# Patient Record
Sex: MALE | Race: WHITE | URBAN - METROPOLITAN AREA
[De-identification: names, ages, dates, MRNs, and addresses within clinical notes are randomized per-mention and may not be internally consistent; named-entity substitution may affect disease eponyms.]

---

## 2018-11-02 ENCOUNTER — OFFICE VISIT (OUTPATIENT)
Dept: ORTHOPEDICS | Facility: CLINIC | Age: 28
End: 2018-11-02
Payer: COMMERCIAL

## 2018-11-02 VITALS — WEIGHT: 189 LBS | HEIGHT: 71 IN | BODY MASS INDEX: 26.46 KG/M2

## 2018-11-02 DIAGNOSIS — M76.51 PATELLAR TENDINITIS OF RIGHT KNEE: ICD-10-CM

## 2018-11-02 DIAGNOSIS — G89.29 CHRONIC PAIN OF RIGHT KNEE: Primary | ICD-10-CM

## 2018-11-02 DIAGNOSIS — M25.561 CHRONIC PAIN OF RIGHT KNEE: Primary | ICD-10-CM

## 2018-11-02 DIAGNOSIS — S76.301A RIGHT HAMSTRING INJURY, INITIAL ENCOUNTER: ICD-10-CM

## 2018-11-02 NOTE — MR AVS SNAPSHOT
"              After Visit Summary   11/2/2018    Oniel Menard    MRN: [id number]           Patient Information     Date Of Birth          1990        Visit Information        Provider Department      11/2/2018 10:20 AM Yesenia Araujo MD [facility]        Today's Diagnoses     Chronic pain of right knee    -  1    Right hamstring injury, initial encounter        Patellar tendinitis of right knee           Follow-ups after your visit        Additional Services     PHYSICAL THERAPY REFERRAL (Internal)       Physical Therapy Referral                  Follow-up notes from your care team     Return in about 6 weeks (around 12/14/2018), or if symptoms worsen or fail to improve.      Your next 10 appointments already scheduled     Nov 08, 2018  8:20 AM CST   (Arrive by 8:05 AM)   [facility] Extremity with Mary Barrett PT   [facility] [place] ([address] [id number] [phone]              Future tests that were ordered for you today     Open Future Orders        Priority Expected Expires Ordered    PHYSICAL THERAPY REFERRAL (Internal) Routine  11/2/2019 11/2/2018            Who to contact     Please call your clinic at [phone] to:    Ask questions about your health    Make or cancel appointments    Discuss your medicines    Learn about your test results    Speak to your doctor            Additional Information About Your Visit        Care EveryWhere ID     This is your Care EveryWhere ID. This could be used by other organizations to access your [facility] medical records  [id number]        Your Vitals Were     Height BMI (Body Mass Index)                5' 11\" (1.803 m) 26.36 kg/m2           Blood Pressure from Last 3 Encounters:   No data found for BP    Weight from Last 3 Encounters:   11/02/18 189 lb (85.7 kg)               Primary Care Provider    None Specified       No " primary provider on file.        Equal Access to Services     BRADADITHYA SHANE : Hadii mingo Kelly, josr meadows, galen spain. So [facility] [phone].    ATENCIÓN: Si habla español, tiene a retana disposición servicios gratuitos de asistencia lingüística. Llame al [phone].    We comply with applicable federal civil rights laws and [place] laws. We do not discriminate on the basis of race, color, national origin, age, disability, sex, sexual orientation, or gender identity.            Thank you!     Thank you for choosing [facility]  for your care. Our goal is always to provide you with excellent care. Hearing back from our patients is one way we can continue to improve our services. Please take a few minutes to complete the written survey that you may receive in the mail after your visit with us. Thank you!             Your Updated Medication List - Protect others around you: Learn how to safely use, store and throw away your medicines at www.disposemymeds.org.      Notice  As of 11/2/2018 12:50 PM    You have not been prescribed any medications.

## 2018-11-02 NOTE — PROGRESS NOTES
SPORTS & ORTHOPEDIC WALK-IN VISIT 11/2/2018    Primary Care Physician:      Right knee pain/lump. He has noticed this lump and tenderness for about 2-3 months. He has pain with squatting. He cannot think of any incident that caused the pain/lump. Lump is over the tibial tuberosity. He has minimal tenderness with palpation. He has has ACL surgery on his left knee in past.     Reason for visit:     What part of your body is injured / painful?  right knee    What caused the injury /pain? No inciting event     How long ago did your injury occur or pain begin? 2-3 months     What are your most bothersome symptoms? Pain    How would you characterize your symptom?  aching and sharp    What makes your symptoms better? Rest, Ice and Tylenol    What makes your symptoms worse? Standing, Walking, Movement and Squatting    Have you been previously seen for this problem? Yes, PT at work     Medical History:    Any recent changes to your medical history? No    Any new medication prescribed since last visit? No    Have you had surgery on this body part before? No    Social History:    Occupation:      Handedness: Right    Exercise: Most days/week    Review of Systems:    Do you have fever, chills, weight loss? No    Do you have any vision problems? No    Do you have any chest pain or edema? No    Do you have any shortness of breath or wheezing?  No    Do you have stomach problems? No    Do you have any numbness or focal weakness? No    Do you have diabetes? No    Do you have problems with bleeding or clotting? No    Do you have an rashes or other skin lesions? No

## 2018-11-02 NOTE — LETTER
11/2/2018       RE: Oniel Menard  [address] [place] [facility] [address]     Dear Colleague,    Thank you for referring your patient, Oniel Menard, to the [facility]IN[facility] at [facility]. Please see a copy of my visit note below.          SPORTS & ORTHOPEDIC WALK-IN VISIT 11/2/2018    Primary Care Physician:      Right knee pain/lump. He has noticed this lump and tenderness for about 2-3 months. He has pain with squatting. He cannot think of any incident that caused the pain/lump. Lump is over the tibial tuberosity. He has minimal tenderness with palpation. He has has ACL surgery on his left knee in past.     Reason for visit:     What part of your body is injured / painful?  right knee    What caused the injury /pain? No inciting event     How long ago did your injury occur or pain begin? 2-3 months     What are your most bothersome symptoms? Pain    How would you characterize your symptom?  aching and sharp    What makes your symptoms better? Rest, Ice and Tylenol    What makes your symptoms worse? Standing, Walking, Movement and Squatting    Have you been previously seen for this problem? Yes, PT at work     Medical History:    Any recent changes to your medical history? No    Any new medication prescribed since last visit? No    Have you had surgery on this body part before? No    Social History:    Occupation:      Handedness: Right    Exercise: Most days/week    Review of Systems:    Do you have fever, chills, weight loss? No    Do you have any vision problems? No    Do you have any chest pain or edema? No    Do you have any shortness of breath or wheezing?  No    Do you have stomach problems? No    Do you have any numbness or focal weakness? No    Do you have diabetes? No    Do you have problems with bleeding or clotting? No    Do you have an rashes or other skin lesions? No           SUBJECTIVE:  "Oniel Menard is a 27 year old male who right knee pain at insertion of patella tendon.  Doesn't know how this happened.  Hamstring injury on the right a couple months ago.  .  Squats, staying away from heavy weights.  Doing lunges.  Some pain during and after activities.  Biking for warm-up. Knee feels fine on the bike.  Staying away from running.  Bracing for prevention, soccer as High Schooler.    ACL reconstruction- [place]  Quad dominant in general  PAST MEDICAL, SOCIAL, SURGICAL AND FAMILY HISTORY: He  has no past medical history on file.  He  has no past surgical history on file.  His family history is not on file.  He reports that he has never smoked. He has never used smokeless tobacco.      ALLERGIES: He has No Known Allergies.    CURRENT MEDICATIONS: He currently has no medications in their medication list.     REVIEW OF SYSTEMS: 10 point review of systems is negative except as noted above.    EXAM:  Ht 1.803 m (5' 11\")  Wt 85.7 kg (189 lb)  BMI 26.36 kg/m2  CONSTITUTIONIAL: healthy, alert, no distress and cooperative  HEAD: Normocephalic. No masses, lesions, tenderness or abnormalities  ENT: ENT exam normal, no neck nodes or sinus tenderness  SKIN: no suspicious lesions or rashes  GAIT: normal  Stance: normal  NEUROLOGIC: Normal muscle tone and strength, reflexes normal, sensation grossly normal.  PSYCHIATRIC: affect normal/bright and mentation appears normal.    MUSCULOSKELETAL: right knee pain      Inspection:       AP/lateral alignment normal  Tender: lateral patella glide, insertional pain of patella tendon  Likely OGS as child      Non-tender: patellar facets, MCL, LCL, lateral joint line, medial joint line, IT band, posterior knee   Active Range of Motion: all normal  Strength: quad  5-/5, Hamstrings  5-/5, Gastroc  5/5, Tibialis anterior  5/5, Peroneals  5/5 and core strength  5-/5 hip abductors and other core muscles   Special tests: normal Valgus stress test, normal " Varus, negative Lachman's test, negative pivot shift, negative posterior drawer, no posterolateral corner signs, negative Andrew's, no apprehension with lateral stress of the patella.        ASSESSMENT/PLAN:  Pt is a 28 yo white male with PMHx of right hamstring pain presenting with right anterior knee pain  1. Right anterior knee pain- insertional patella tendonitis, lateral tracking of patella- see PT, trial of Chadwick taping  Strengthening  S/p ACL reconstruction on left side    RTC 6 weeks/PRN    X-RAY INTERPRETATION:   none    Again, thank you for allowing me to participate in the care of your patient.      Sincerely,    Yesenia Araujo MD

## 2018-11-02 NOTE — PROGRESS NOTES
"SUBJECTIVE: Oniel Menard is a 27 year old male who right knee pain at insertion of patella tendon.  Doesn't know how this happened.  Hamstring injury on the right a couple months ago.  .  Squats, staying away from heavy weights.  Doing lunges.  Some pain during and after activities.  Biking for warm-up. Knee feels fine on the bike.  Staying away from running.  Bracing for prevention, soccer as High Schooler.    ACL reconstruction- [place]  Quad dominant in general  PAST MEDICAL, SOCIAL, SURGICAL AND FAMILY HISTORY: He  has no past medical history on file.  He  has no past surgical history on file.  His family history is not on file.  He reports that he has never smoked. He has never used smokeless tobacco.      ALLERGIES: He has No Known Allergies.    CURRENT MEDICATIONS: He currently has no medications in their medication list.     REVIEW OF SYSTEMS: 10 point review of systems is negative except as noted above.    EXAM:  Ht 1.803 m (5' 11\")  Wt 85.7 kg (189 lb)  BMI 26.36 kg/m2  CONSTITUTIONIAL: healthy, alert, no distress and cooperative  HEAD: Normocephalic. No masses, lesions, tenderness or abnormalities  ENT: ENT exam normal, no neck nodes or sinus tenderness  SKIN: no suspicious lesions or rashes  GAIT: normal  Stance: normal  NEUROLOGIC: Normal muscle tone and strength, reflexes normal, sensation grossly normal.  PSYCHIATRIC: affect normal/bright and mentation appears normal.    MUSCULOSKELETAL: right knee pain      Inspection:       AP/lateral alignment normal  Tender: lateral patella glide, insertional pain of patella tendon  Likely OGS as child      Non-tender: patellar facets, MCL, LCL, lateral joint line, medial joint line, IT band, posterior knee   Active Range of Motion: all normal  Strength: quad  5-/5, Hamstrings  5-/5, Gastroc  5/5, Tibialis anterior  5/5, Peroneals  5/5 and core strength  5-/5 hip abductors and other core muscles   Special tests: normal Valgus stress " test, normal Varus, negative Lachman's test, negative pivot shift, negative posterior drawer, no posterolateral corner signs, negative Andrew's, no apprehension with lateral stress of the patella.        ASSESSMENT/PLAN:  Pt is a 26 yo white male with PMHx of right hamstring pain presenting with right anterior knee pain  1. Right anterior knee pain- insertional patella tendonitis, lateral tracking of patella- see PT, trial of Chadwick taping  Strengthening  S/p ACL reconstruction on left side    RTC 6 weeks/PRN    X-RAY INTERPRETATION:   none

## 2018-11-02 NOTE — LETTER
Date:November 5, 2018      Patient was self referred, no letter generated. Do not send.        [facility] Health Information

## 2018-11-08 ENCOUNTER — THERAPY VISIT (OUTPATIENT)
Dept: PHYSICAL THERAPY | Facility: CLINIC | Age: 28
End: 2018-11-08
Attending: FAMILY MEDICINE
Payer: COMMERCIAL

## 2018-11-08 DIAGNOSIS — S76.301A RIGHT HAMSTRING INJURY, INITIAL ENCOUNTER: ICD-10-CM

## 2018-11-08 DIAGNOSIS — M25.561 RIGHT KNEE PAIN, UNSPECIFIED CHRONICITY: Primary | ICD-10-CM

## 2018-11-08 DIAGNOSIS — G89.29 CHRONIC PAIN OF RIGHT KNEE: ICD-10-CM

## 2018-11-08 DIAGNOSIS — M76.51 PATELLAR TENDINITIS OF RIGHT KNEE: ICD-10-CM

## 2018-11-08 DIAGNOSIS — M25.561 CHRONIC PAIN OF RIGHT KNEE: ICD-10-CM

## 2018-11-08 PROCEDURE — 97530 THERAPEUTIC ACTIVITIES: CPT | Mod: GP | Performed by: PHYSICAL THERAPIST

## 2018-11-08 PROCEDURE — 97161 PT EVAL LOW COMPLEX 20 MIN: CPT | Mod: GP | Performed by: PHYSICAL THERAPIST

## 2018-11-08 PROCEDURE — 97112 NEUROMUSCULAR REEDUCATION: CPT | Mod: GP | Performed by: PHYSICAL THERAPIST

## 2018-11-08 ASSESSMENT — ACTIVITIES OF DAILY LIVING (ADL)
GO UP STAIRS: ACTIVITY IS NOT DIFFICULT
WALK: ACTIVITY IS NOT DIFFICULT
AS_A_RESULT_OF_YOUR_KNEE_INJURY,_HOW_WOULD_YOU_RATE_YOUR_CURRENT_LEVEL_OF_DAILY_ACTIVITY?: NEARLY NORMAL
RAW_SCORE: 61
GO DOWN STAIRS: ACTIVITY IS MINIMALLY DIFFICULT
HOW_WOULD_YOU_RATE_THE_OVERALL_FUNCTION_OF_YOUR_KNEE_DURING_YOUR_USUAL_DAILY_ACTIVITIES?: NEARLY NORMAL
KNEE_ACTIVITY_OF_DAILY_LIVING_SCORE: 87.14
RISE FROM A CHAIR: ACTIVITY IS NOT DIFFICULT
STAND: ACTIVITY IS NOT DIFFICULT
HOW_WOULD_YOU_RATE_THE_CURRENT_FUNCTION_OF_YOUR_KNEE_DURING_YOUR_USUAL_DAILY_ACTIVITIES_ON_A_SCALE_FROM_0_TO_100_WITH_100_BEING_YOUR_LEVEL_OF_KNEE_FUNCTION_PRIOR_TO_YOUR_INJURY_AND_0_BEING_THE_INABILITY_TO_PERFORM_ANY_OF_YOUR_USUAL_DAILY_ACTIVITIES?: 80
PAIN: THE SYMPTOM AFFECTS MY ACTIVITY SLIGHTLY
WEAKNESS: THE SYMPTOM AFFECTS MY ACTIVITY SLIGHTLY
LIMPING: I DO NOT HAVE THE SYMPTOM
SQUAT: ACTIVITY IS SOMEWHAT DIFFICULT
KNEE_ACTIVITY_OF_DAILY_LIVING_SUM: 61
SWELLING: I HAVE THE SYMPTOM BUT IT DOES NOT AFFECT MY ACTIVITY
GIVING WAY, BUCKLING OR SHIFTING OF KNEE: I DO NOT HAVE THE SYMPTOM
SIT WITH YOUR KNEE BENT: ACTIVITY IS NOT DIFFICULT
STIFFNESS: I HAVE THE SYMPTOM BUT IT DOES NOT AFFECT MY ACTIVITY
KNEEL ON THE FRONT OF YOUR KNEE: ACTIVITY IS NOT DIFFICULT

## 2018-11-08 NOTE — PROGRESS NOTES
[facility] Initial Evaluation  Subjective:  Patient is a 27 year old male presenting with rehab right knee hpi. The history is provided by the patient.   Oniel Menard is a 27 year old male with a right knee condition.  Occurance: knee pain onset after HS strain while sliding in soccer.  Condition occurred: during recreation/sport.  This is a new condition  August 2018 - pt reports initially strained right hamstring during a slide in soccer about 2-3 months ago. Shortly after this he developed right anterior knee pain and swelling. No previous history of right knee problems, does have history of left ACL repair. Hamstring has greatly improved, now just anterior knee pain with greatest limitation in squatting. Is a  at [employer]. Used to have tenderness but has has improved.   Doing lower weights now with squatting. .    Patient reports pain:  Anterior (tibial tubercle).  Radiates to: none.  Pain is described as other (tenderness) and is intermittent and reported as 4/10 (at worst recently, used to get up to 8/10).  Associated with: decreased strength, used to have swelling. Pain is the same all the time (activity dependent).  Symptoms are exacerbated by bending/squatting and other (lunges) and relieved by other (patellar strap wearing at bedtime, just started some KT-taping tuesday).  Since onset symptoms are gradually improving.  Special testing: none.  Previous treatment: none.  Improvement with previous treatment: NA.  General health as reported by patient is excellent.                                              Objective:  Standing Alignment:              Knee deviations alignment: internal tibial rotation, lateral femoral rotation bilat in stance.                                                       Hip Evaluation    Hip Strength:    Flexion:   Left: 5/5   Pain:  Right: 5/5   Pain:                    Extension:  Left: 5/5  Pain:Right: 5/5    Pain:     Abduction:  Left: 5-/5     Pain:Right: 4+/5    Pain:                           Knee Evaluation:  ROM:  PROM: normal  Strength wnl knee: prone hamstring testing on right: at 110 deg flexion: 5-/5 with prox HS belly pain. at 90 deg flexion: 5-/5 with anterior knee pain. at 60 deg flexion: 5/5 painfree.          Strength:     Extension:  Left: 5/5   Pain:      Right: 5/5   Pain:  Flexion:  Left: 5/5   Pain:      Right: 5-/5   Pain:        Ligament Testing:    Varus 0:  Right:  Neg    Valgus 0:  Right:  Neg  Valgus 30:  Right:  Neg  Anterior Drawer:  Right:  Neg      Special Tests:       Right knee negative for the following special tests:  Meniscal  Palpation:  Palpation of knee: trace tenderness at tibial tubercle (site of localized pain)      Right knee tenderness not present at:  Patellar Tendon and Patellar Inferior  Edema:  Normal    Mobility Testing:      Patellofemoral Medial:  Right: normal  Patellofemoral Lateral:  Right: normal  Patellofemoral Superior:  Right: normal  Patellofemoral Inferior:  Right: normal  Functional Testing:  : 6 inch front elidia down: left  - normal control. right - contralat pelvic drop.        Quad:    Single Leg Squat:  Left:        Normal control  Right:      Slight decr frontal plane knee stability, pain  Mild loss of control  Bilateral Leg Squat:   Normal control            With trial of lateral>medial patellar glide taping: decr pain with SL squat after  General     ROS    Assessment/Plan:    Patient is a 27 year old male with right side knee complaints.    Patient has the following significant findings with corresponding treatment plan.                Diagnosis 1:  Right knee pain/distal patellar tendinitis    Pain -  splint/taping/bracing/orthotics, self management, education and home program  Decreased strength - therapeutic exercise, therapeutic activities and home program  Decreased proprioception - neuro re-education, therapeutic activities and home program  Decreased  function - therapeutic activities and home program    Therapy Evaluation Codes:   1) History comprised of:   Personal factors that impact the plan of care:      None.    Comorbidity factors that impact the plan of care are:      None.     Medications impacting care: None.  2) Examination of Body Systems comprised of:   Body structures and functions that impact the plan of care:      Knee.   Activity limitations that impact the plan of care are:      Squatting/kneeling and lunges.  3) Clinical presentation characteristics are:   Stable/Uncomplicated.  4) Decision-Making    Low complexity using standardized patient assessment instrument and/or measureable assessment of functional outcome.  Cumulative Therapy Evaluation is: Low complexity.    Previous and current functional limitations:  (See Goal Flow Sheet for this information)    Short term and Long term goals: (See Goal Flow Sheet for this information)     Communication ability:  Patient appears to be able to clearly communicate and understand verbal and written communication and follow directions correctly.  Treatment Explanation - The following has been discussed with the patient:   RX ordered/plan of care  Anticipated outcomes  Possible risks and side effects  This patient would benefit from PT intervention to resume normal activities.   Rehab potential is good.    Frequency:  2 X a month, once daily  Duration:  for 2 months  Discharge Plan:  Achieve all LTG.  Independent in home treatment program.  Reach maximal therapeutic benefit.    Please refer to the daily flowsheet for treatment today, total treatment time and time spent performing 1:1 timed codes.

## 2018-11-08 NOTE — MR AVS SNAPSHOT
After Visit Summary   11/8/2018    Oniel Menard    MRN: [id number]           Patient Information     Date Of Birth          1990        Visit Information        Provider Department      11/8/2018 8:20 AM Mary Barrett PT [facility]        Today's Diagnoses     Right knee pain, unspecified chronicity    -  1    Right hamstring injury, initial encounter        Chronic pain of right knee        Patellar tendinitis of right knee           Follow-ups after your visit        Your next 10 appointments already scheduled     Nov 28, 2018  9:30 AM CST   [facility] Extremity with Mary Barrett PT   [facility] ([facility])    [address] [place] [id number] [phone]              Who to contact     If you have questions or need follow up information about today's clinic visit or your schedule please contact [facility] [place] directly at [phone].  Normal or non-critical lab and imaging results will be communicated to you by MyChart, letter or phone within 4 business days after the clinic has received the results. If you do not hear from us within 7 days, please contact the clinic through MyChart or phone. If you have a critical or abnormal lab result, we will notify you by phone as soon as possible.  Submit refill requests through [employer] or call your pharmacy and they will forward the refill request to us. Please allow 3 business days for your refill to be completed.          Additional Information About Your Visit        Care EveryWhere ID     This is your Care EveryWhere ID. This could be used by other organizations to access your [place] medical records  [id number]         Blood Pressure from Last 3 Encounters:   No data found for BP    Weight from Last 3 Encounters:   11/02/18 85.7 kg (189 lb)              We Performed the Following     HC PT EVAL, LOW COMPLEXITY      [facility] INITIAL EVAL REPORT     NEUROMUSCULAR RE-EDUCATION     PHYSICAL THERAPY REFERRAL (Internal)     THERAPEUTIC ACTIVITIES        Primary Care Provider    None Specified       No primary provider on file.        Equal Access to Services     RUDDY LYNN : Hadii aad ku haddanielsepideh Kelly, josr meadows, goldychela altamiranonatheriberto salescourtney, galen marionin hayaan henrryallyssa fabian. So [facility] [phone].    ATENCIÓN: Si habla español, tiene a retana disposición servicios gratuitos de asistencia lingüística. Llame al [phone].    We comply with applicable federal civil rights laws and [place] laws. We do not discriminate on the basis of race, color, national origin, age, disability, sex, sexual orientation, or gender identity.            Thank you!     Thank you for choosing [facility] [place]  for your care. Our goal is always to provide you with excellent care. Hearing back from our patients is one way we can continue to improve our services. Please take a few minutes to complete the written survey that you may receive in the mail after your visit with us. Thank you!             Your Updated Medication List - Protect others around you: Learn how to safely use, store and throw away your medicines at www.disposemymeds.org.      Notice  As of 11/8/2018 10:13 AM    You have not been prescribed any medications.

## 2019-02-11 PROBLEM — M25.561 RIGHT KNEE PAIN, UNSPECIFIED CHRONICITY: Status: RESOLVED | Noted: 2018-11-08 | Resolved: 2019-02-11

## 2019-02-11 NOTE — PROGRESS NOTES
Patient seen one time for evaluation and treatment on Nov 8, 2018.  Patient did not return for further treatment to complete their physical therapy plan of care, and current status is unknown.  Please see initial evaluation dated Nov 8, 2018  for further information/discharge information.   Discharge patient from PT at this time.